# Patient Record
Sex: MALE | Race: WHITE | NOT HISPANIC OR LATINO
[De-identification: names, ages, dates, MRNs, and addresses within clinical notes are randomized per-mention and may not be internally consistent; named-entity substitution may affect disease eponyms.]

---

## 2017-09-12 PROBLEM — Z00.00 ENCOUNTER FOR PREVENTIVE HEALTH EXAMINATION: Status: ACTIVE | Noted: 2017-09-12

## 2017-09-20 ENCOUNTER — APPOINTMENT (OUTPATIENT)
Dept: ORTHOPEDIC SURGERY | Facility: CLINIC | Age: 42
End: 2017-09-20
Payer: MEDICAID

## 2017-09-20 VITALS — BODY MASS INDEX: 46.65 KG/M2 | WEIGHT: 315 LBS | HEIGHT: 69 IN

## 2017-09-20 DIAGNOSIS — M25.551 PAIN IN RIGHT HIP: ICD-10-CM

## 2017-09-20 DIAGNOSIS — Z78.9 OTHER SPECIFIED HEALTH STATUS: ICD-10-CM

## 2017-09-20 PROCEDURE — 73502 X-RAY EXAM HIP UNI 2-3 VIEWS: CPT | Mod: 26,RT

## 2017-09-20 PROCEDURE — 99203 OFFICE O/P NEW LOW 30 MIN: CPT

## 2018-07-23 PROBLEM — Z78.9 ALCOHOL USE: Status: ACTIVE | Noted: 2017-09-20

## 2021-11-23 ENCOUNTER — APPOINTMENT (OUTPATIENT)
Dept: INTERNAL MEDICINE | Facility: CLINIC | Age: 46
End: 2021-11-23

## 2021-12-10 ENCOUNTER — APPOINTMENT (OUTPATIENT)
Dept: INTERNAL MEDICINE | Facility: CLINIC | Age: 46
End: 2021-12-10

## 2022-01-17 DIAGNOSIS — R37 SEXUAL DYSFUNCTION, UNSPECIFIED: ICD-10-CM

## 2022-01-17 DIAGNOSIS — Z78.9 OTHER SPECIFIED HEALTH STATUS: ICD-10-CM

## 2022-01-17 DIAGNOSIS — I87.2 VENOUS INSUFFICIENCY (CHRONIC) (PERIPHERAL): ICD-10-CM

## 2022-01-17 DIAGNOSIS — Z86.2 PERSONAL HISTORY OF DISEASES OF THE BLOOD AND BLOOD-FORMING ORGANS AND CERTAIN DISORDERS INVOLVING THE IMMUNE MECHANISM: ICD-10-CM

## 2022-01-17 DIAGNOSIS — Z81.8 FAMILY HISTORY OF OTHER MENTAL AND BEHAVIORAL DISORDERS: ICD-10-CM

## 2022-01-17 DIAGNOSIS — Z82.49 FAMILY HISTORY OF ISCHEMIC HEART DISEASE AND OTHER DISEASES OF THE CIRCULATORY SYSTEM: ICD-10-CM

## 2022-01-17 DIAGNOSIS — M17.10 UNILATERAL PRIMARY OSTEOARTHRITIS, UNSPECIFIED KNEE: ICD-10-CM

## 2022-01-20 ENCOUNTER — TRANSCRIPTION ENCOUNTER (OUTPATIENT)
Age: 47
End: 2022-01-20

## 2022-02-07 ENCOUNTER — APPOINTMENT (OUTPATIENT)
Dept: BARIATRICS/WEIGHT MGMT | Facility: CLINIC | Age: 47
End: 2022-02-07

## 2022-02-07 DIAGNOSIS — G47.33 OBSTRUCTIVE SLEEP APNEA (ADULT) (PEDIATRIC): ICD-10-CM

## 2022-02-07 DIAGNOSIS — K76.0 FATTY (CHANGE OF) LIVER, NOT ELSEWHERE CLASSIFIED: ICD-10-CM

## 2022-02-07 DIAGNOSIS — E66.01 MORBID (SEVERE) OBESITY DUE TO EXCESS CALORIES: ICD-10-CM

## 2022-02-07 DIAGNOSIS — F32.A ANXIETY DISORDER, UNSPECIFIED: ICD-10-CM

## 2022-02-07 DIAGNOSIS — F41.9 ANXIETY DISORDER, UNSPECIFIED: ICD-10-CM

## 2022-02-07 DIAGNOSIS — Z99.89 OBSTRUCTIVE SLEEP APNEA (ADULT) (PEDIATRIC): ICD-10-CM

## 2022-02-07 DIAGNOSIS — I10 ESSENTIAL (PRIMARY) HYPERTENSION: ICD-10-CM

## 2022-02-07 DIAGNOSIS — M16.11 UNILATERAL PRIMARY OSTEOARTHRITIS, RIGHT HIP: ICD-10-CM

## 2022-02-07 PROBLEM — M17.10 OSTEOARTHRITIS OF KNEE, UNSPECIFIED LATERALITY, UNSPECIFIED OSTEOARTHRITIS TYPE: Status: ACTIVE | Noted: 2022-02-07

## 2022-02-07 PROBLEM — I87.2 VENOUS INSUFFICIENCY: Status: ACTIVE | Noted: 2022-02-07

## 2022-02-07 PROBLEM — Z78.9 DOES NOT USE TOBACCO: Status: ACTIVE | Noted: 2022-02-07

## 2022-02-07 PROBLEM — R37 SEXUAL DYSFUNCTION, UNSPECIFIED: Status: ACTIVE | Noted: 2022-02-07

## 2022-02-07 PROBLEM — Z81.8 FAMILY HISTORY OF DEMENTIA: Status: ACTIVE | Noted: 2022-02-07

## 2022-02-07 PROBLEM — Z82.49 FAMILY HISTORY OF MYOCARDIAL INFARCTION: Status: ACTIVE | Noted: 2022-02-07

## 2022-02-07 PROBLEM — Z86.2 H/O THALASSEMIA MINOR: Status: RESOLVED | Noted: 2022-02-07 | Resolved: 2022-02-07

## 2022-02-07 NOTE — HISTORY OF PRESENT ILLNESS
[Home] : at home, [unfilled] , at the time of the visit. [Medical Office: ([facility])___] : at the medical office located in  [Verbal consent obtained from patient] : the patient, [unfilled] [FreeTextEntry1] : 46M PMH obesity, HTN, ROMAINE\par who presents to weight management for initial evaluation.\par \par PMH:\par Meds:\par Allergies:\par Surgeries:\par FHx:\par Social Hx: family, work, substance\par \par Reason for coming\par How he/she heard\par \par Weight/Diet History:\par \par \par Weight today: \par 462 lbs, BMI 67.25\par \par \par Diet:\par B:\par L:\par D:\par Dessert:\par Snack:\par Beverages:\par Night-time eating:\par Binging: \par Fast-food/Restaurants:\par Cook/Prepare meals:\par Added oils:\par Food Journal:\par \par Exercise:\par \par Sleep:\par \par Social:\par

## 2022-09-30 ENCOUNTER — APPOINTMENT (OUTPATIENT)
Dept: BARIATRICS | Facility: CLINIC | Age: 47
End: 2022-09-30

## 2022-11-08 ENCOUNTER — APPOINTMENT (OUTPATIENT)
Dept: PEDIATRIC ORTHOPEDIC SURGERY | Facility: CLINIC | Age: 47
End: 2022-11-08

## 2023-06-19 ENCOUNTER — APPOINTMENT (OUTPATIENT)
Dept: CARDIOLOGY | Facility: CLINIC | Age: 48
End: 2023-06-19

## 2024-04-26 ENCOUNTER — APPOINTMENT (OUTPATIENT)
Dept: BARIATRICS | Facility: CLINIC | Age: 49
End: 2024-04-26